# Patient Record
(demographics unavailable — no encounter records)

---

## 2025-04-24 NOTE — DATA REVIEWED
[FreeTextEntry1] : Audiogram 4/24/25, personally reviewed: hearing within normal limits 500 - 4000 Hz, type As tympanograms bilaterally

## 2025-04-24 NOTE — ASSESSMENT
[FreeTextEntry1] : I personally reviewed and interpreted the audiogram. I explained the results of the audiogram to the family. I am pleased that hearing is within normal limits today.  We discussed snoring and sleep disordered breathing. We discussed concerning signs for apnea to monitor for.   Follow up as needed.

## 2025-04-24 NOTE — REASON FOR VISIT
[Initial Consultation] : an initial consultation for [[facility] ] : provided by [employer] [facility]   [FreeTextEntry2] : hearing concerns [Mother] : mother [Interpreters_IDNumber] : [id number] [Interpreters_FullName] : Anita [TWNoteComboBox1] : [place]

## 2025-04-24 NOTE — HISTORY OF PRESENT ILLNESS
[de-identified] : Neftaly is a 4 year old boy here for evaluation of hearing concerns.  Here with mom who provides history through .  Referred by PCP for audiogram.   Ear pain in the left ear.  No recent ear infections. Had a few ear infections when he was younger.  No history of ear tubes or ear surgery.   Does snore sometimes. Has nasal congestion when sick.  Uses nasal saline when sick.  No choking or gasping with snoring.  No daytime fatigue.   No recurrent throat infections.  No other otolaryngology concerns.

## 2025-04-24 NOTE — CONSULT LETTER
[Dear  ___] : Dear  [unfilled], [Courtesy Letter:] : I had the pleasure of seeing your patient, [unfilled], in my office today. [Please see my note below.] : Please see my note below. [Sincerely,] : Sincerely, [FreeTextEntry3] : Zo Jackson M.D. [place] [address] Tel [phone] Fax [phone]